# Patient Record
Sex: FEMALE
[De-identification: names, ages, dates, MRNs, and addresses within clinical notes are randomized per-mention and may not be internally consistent; named-entity substitution may affect disease eponyms.]

---

## 2021-04-22 ENCOUNTER — HOSPITAL ENCOUNTER (OUTPATIENT)
Dept: HOSPITAL 5 - XRAY | Age: 31
Discharge: HOME | End: 2021-04-22
Attending: PODIATRIST
Payer: MEDICAID

## 2021-04-22 DIAGNOSIS — M13.871: ICD-10-CM

## 2021-04-22 DIAGNOSIS — M13.872: Primary | ICD-10-CM

## 2021-04-22 NOTE — XRAY REPORT
XR foot BILAT 3+V, XR ankle BILAT 3+V



INDICATION / CLINICAL INFORMATION:

M12.9 PAIN.



COMPARISON: 

None available.



FINDINGS:

Right foot/ankle: No acute or healing fracture is identified. There is no malalignment. Lisfranc inte
rval is preserved. Ankle mortise is symmetric. No focal soft tissue abnormality.



Left foot/ankle: No acute or healing fracture is identified. No malalignment. Lisfranc interval is pr
eserved. Ankle mortise is symmetric. No focal soft tissue abnormality.



IMPRESSION:

No acute abnormality of bilateral feet or ankles. No acute or healing fracture is identified.



Signer Name: Justo Mosqueda MD 

Signed: 4/22/2021 2:55 PM

Workstation Name: [email or web address]

## 2021-04-22 NOTE — XRAY REPORT
XR foot BILAT 3+V, XR ankle BILAT 3+V



INDICATION / CLINICAL INFORMATION:

M12.9 PAIN.



COMPARISON: 

None available.



FINDINGS:

Right foot/ankle: No acute or healing fracture is identified. There is no malalignment. Lisfranc inte
rval is preserved. Ankle mortise is symmetric. No focal soft tissue abnormality.



Left foot/ankle: No acute or healing fracture is identified. No malalignment. Lisfranc interval is pr
eserved. Ankle mortise is symmetric. No focal soft tissue abnormality.



IMPRESSION:

No acute abnormality of bilateral feet or ankles. No acute or healing fracture is identified.



Signer Name: Justo Mosqueda MD 

Signed: 4/22/2021 2:55 PM

Workstation Name: [email or web address]

## 2021-07-19 ENCOUNTER — OFFICE VISIT (OUTPATIENT)
Dept: URBAN - METROPOLITAN AREA TELEHEALTH 2 | Facility: TELEHEALTH | Age: 31
End: 2021-07-19
Payer: MEDICAID

## 2021-07-19 ENCOUNTER — TELEPHONE ENCOUNTER (OUTPATIENT)
Dept: URBAN - METROPOLITAN AREA CLINIC 92 | Facility: CLINIC | Age: 31
End: 2021-07-19

## 2021-07-19 DIAGNOSIS — R10.13 EPIGASTRIC ABDOMINAL PAIN: ICD-10-CM

## 2021-07-19 DIAGNOSIS — K57.30 DIVERTICULA OF COLON: ICD-10-CM

## 2021-07-19 DIAGNOSIS — K44.9 HIATAL HERNIA: ICD-10-CM

## 2021-07-19 DIAGNOSIS — K64.9 HEMORRHOIDS, UNSPECIFIED HEMORRHOID TYPE: ICD-10-CM

## 2021-07-19 PROBLEM — 733657002: Status: ACTIVE | Noted: 2021-07-19

## 2021-07-19 PROCEDURE — 99442 PHONE E/M BY PHYS 11-20 MIN: CPT | Performed by: INTERNAL MEDICINE

## 2021-07-19 RX ORDER — OMEPRAZOLE 40 MG/1
1 CAPSULE 30 MINUTES BEFORE MORNING MEAL CAPSULE, DELAYED RELEASE ORAL ONCE A DAY
Qty: 30 | Refills: 6 | OUTPATIENT
Start: 2021-07-19

## 2021-07-19 NOTE — HPI-TODAY'S VISIT:
This a 30-year-old female presents for a follow-up.  She was last seen in our practice in 2017.  She notes that about 3-4 times a week she has postprandial abdominal pain which is burning in quality.  It is migratory.  It lasts only a few minutes and then resolve spontaneously.  Her bowel habits are normal.  There is no nausea vomiting.  There is no blood in stool.  She has not had an intentional 40 pound weight loss.  She is still 20 pounds heavier than last measured here.

## 2022-03-30 ENCOUNTER — OFFICE VISIT (OUTPATIENT)
Dept: URBAN - METROPOLITAN AREA CLINIC 118 | Facility: CLINIC | Age: 32
End: 2022-03-30
Payer: MEDICAID

## 2022-03-30 ENCOUNTER — LAB OUTSIDE AN ENCOUNTER (OUTPATIENT)
Dept: URBAN - METROPOLITAN AREA CLINIC 118 | Facility: CLINIC | Age: 32
End: 2022-03-30

## 2022-03-30 ENCOUNTER — WEB ENCOUNTER (OUTPATIENT)
Dept: URBAN - METROPOLITAN AREA CLINIC 118 | Facility: CLINIC | Age: 32
End: 2022-03-30

## 2022-03-30 VITALS
DIASTOLIC BLOOD PRESSURE: 81 MMHG | TEMPERATURE: 97.7 F | WEIGHT: 256 LBS | BODY MASS INDEX: 41.14 KG/M2 | HEIGHT: 66 IN | SYSTOLIC BLOOD PRESSURE: 127 MMHG | HEART RATE: 64 BPM

## 2022-03-30 DIAGNOSIS — R10.13 EPIGASTRIC ABDOMINAL PAIN: ICD-10-CM

## 2022-03-30 DIAGNOSIS — R10.11 RUQ ABDOMINAL PAIN: ICD-10-CM

## 2022-03-30 DIAGNOSIS — R11.0 NAUSEA: ICD-10-CM

## 2022-03-30 PROCEDURE — 99214 OFFICE O/P EST MOD 30 MIN: CPT | Performed by: INTERNAL MEDICINE

## 2022-03-30 RX ORDER — PANTOPRAZOLE SODIUM 40 MG/1
1 TABLET TABLET, DELAYED RELEASE ORAL ONCE A DAY
Qty: 90 | Refills: 3 | OUTPATIENT
Start: 2022-03-30

## 2022-03-30 RX ORDER — OMEPRAZOLE 40 MG/1
1 CAPSULE 30 MINUTES BEFORE MORNING MEAL CAPSULE, DELAYED RELEASE ORAL ONCE A DAY
Qty: 30 | Refills: 6 | Status: ACTIVE | COMMUNITY
Start: 2021-07-19

## 2022-03-30 NOTE — HPI-TODAY'S VISIT:
she reports post prandial abd pain, severe, sharp, non radiating, chronic nausea, intermittant, worse with eating and better with nothing also with ruq separate pain stopped the ppi a while ago abd pain present for the last few months, worsening   last seen 7/2021  on ppi  EGD 2015 small HH and hp neg gastritis colon 2013 shows hemorrhoids and tics (had diverticulitis at age 22 when was pregnant)

## 2022-04-05 ENCOUNTER — TELEPHONE ENCOUNTER (OUTPATIENT)
Dept: URBAN - METROPOLITAN AREA CLINIC 118 | Facility: CLINIC | Age: 32
End: 2022-04-05

## 2022-04-06 LAB — H. PYLORI STOOL AG, EIA: NEGATIVE

## 2022-04-21 ENCOUNTER — CLAIMS CREATED FROM THE CLAIM WINDOW (OUTPATIENT)
Dept: URBAN - METROPOLITAN AREA TELEHEALTH 2 | Facility: TELEHEALTH | Age: 32
End: 2022-04-21
Payer: MEDICAID

## 2022-04-21 ENCOUNTER — LAB OUTSIDE AN ENCOUNTER (OUTPATIENT)
Dept: URBAN - METROPOLITAN AREA TELEHEALTH 2 | Facility: TELEHEALTH | Age: 32
End: 2022-04-21

## 2022-04-21 VITALS — BODY MASS INDEX: 40.18 KG/M2 | HEIGHT: 66 IN | WEIGHT: 250 LBS

## 2022-04-21 DIAGNOSIS — R11.0 NAUSEA: ICD-10-CM

## 2022-04-21 DIAGNOSIS — R10.11 RUQ ABDOMINAL PAIN: ICD-10-CM

## 2022-04-21 DIAGNOSIS — R10.13 EPIGASTRIC ABDOMINAL PAIN: ICD-10-CM

## 2022-04-21 PROCEDURE — 99214 OFFICE O/P EST MOD 30 MIN: CPT | Performed by: INTERNAL MEDICINE

## 2022-04-21 RX ORDER — PANTOPRAZOLE SODIUM 40 MG/1
1 TABLET TABLET, DELAYED RELEASE ORAL ONCE A DAY
Qty: 90 | Refills: 3 | Status: ON HOLD | COMMUNITY
Start: 2022-03-30

## 2022-04-21 RX ORDER — OMEPRAZOLE 40 MG/1
1 CAPSULE 30 MINUTES BEFORE MORNING MEAL CAPSULE, DELAYED RELEASE ORAL ONCE A DAY
Qty: 30 | Refills: 6 | Status: ON HOLD | COMMUNITY
Start: 2021-07-19

## 2022-04-27 ENCOUNTER — OFFICE VISIT (OUTPATIENT)
Dept: URBAN - METROPOLITAN AREA CLINIC 118 | Facility: CLINIC | Age: 32
End: 2022-04-27

## 2022-04-27 ENCOUNTER — TELEPHONE ENCOUNTER (OUTPATIENT)
Dept: URBAN - METROPOLITAN AREA CLINIC 118 | Facility: CLINIC | Age: 32
End: 2022-04-27

## 2022-05-12 ENCOUNTER — TELEPHONE ENCOUNTER (OUTPATIENT)
Dept: URBAN - METROPOLITAN AREA CLINIC 118 | Facility: CLINIC | Age: 32
End: 2022-05-12

## 2022-05-18 ENCOUNTER — OFFICE VISIT (OUTPATIENT)
Dept: URBAN - METROPOLITAN AREA TELEHEALTH 2 | Facility: TELEHEALTH | Age: 32
End: 2022-05-18

## 2022-07-09 ENCOUNTER — TELEPHONE ENCOUNTER (OUTPATIENT)
Dept: URBAN - METROPOLITAN AREA CLINIC 118 | Facility: CLINIC | Age: 32
End: 2022-07-09

## 2022-07-18 ENCOUNTER — LAB OUTSIDE AN ENCOUNTER (OUTPATIENT)
Dept: URBAN - METROPOLITAN AREA CLINIC 118 | Facility: CLINIC | Age: 32
End: 2022-07-18

## 2022-07-18 ENCOUNTER — OFFICE VISIT (OUTPATIENT)
Dept: URBAN - METROPOLITAN AREA CLINIC 118 | Facility: CLINIC | Age: 32
End: 2022-07-18
Payer: MEDICAID

## 2022-07-18 VITALS
DIASTOLIC BLOOD PRESSURE: 83 MMHG | HEIGHT: 66 IN | SYSTOLIC BLOOD PRESSURE: 119 MMHG | BODY MASS INDEX: 42.43 KG/M2 | WEIGHT: 264 LBS

## 2022-07-18 DIAGNOSIS — R10.30 LOWER ABDOMINAL PAIN: ICD-10-CM

## 2022-07-18 DIAGNOSIS — R10.32 LLQ ABDOMINAL PAIN: ICD-10-CM

## 2022-07-18 DIAGNOSIS — R11.0 NAUSEA: ICD-10-CM

## 2022-07-18 DIAGNOSIS — R63.0 LOSS OF APPETITE: ICD-10-CM

## 2022-07-18 DIAGNOSIS — Z90.49 STATUS POST LAPAROSCOPIC CHOLECYSTECTOMY: ICD-10-CM

## 2022-07-18 DIAGNOSIS — R19.7 DIARRHEA, UNSPECIFIED TYPE: ICD-10-CM

## 2022-07-18 DIAGNOSIS — R19.7 ACUTE DIARRHEA: ICD-10-CM

## 2022-07-18 PROCEDURE — 99214 OFFICE O/P EST MOD 30 MIN: CPT | Performed by: INTERNAL MEDICINE

## 2022-07-18 RX ORDER — LEVOTHYROXINE SODIUM 50 UG/1
1 TABLET IN THE MORNING ON AN EMPTY STOMACH TABLET ORAL ONCE A DAY
Status: ACTIVE | COMMUNITY

## 2022-07-18 RX ORDER — PANTOPRAZOLE SODIUM 40 MG/1
1 TABLET TABLET, DELAYED RELEASE ORAL ONCE A DAY
Qty: 90 | Refills: 3 | Status: ACTIVE | COMMUNITY
Start: 2022-03-30

## 2022-07-18 RX ORDER — CHOLESTYRAMINE 4 G/9G
1 SCOOP POWDER, FOR SUSPENSION ORAL TID
Qty: 1080 GRAM | Refills: 3 | OUTPATIENT
Start: 2022-07-18

## 2022-07-18 RX ORDER — OMEPRAZOLE 40 MG/1
1 CAPSULE 30 MINUTES BEFORE MORNING MEAL CAPSULE, DELAYED RELEASE ORAL ONCE A DAY
Qty: 30 | Refills: 6 | Status: ON HOLD | COMMUNITY
Start: 2021-07-19

## 2022-07-18 NOTE — HPI-TODAY'S VISIT:
LLQ pelvic pain, radiates to the right, can be contraction quality, pelvic pressure  also associated with nausea loss of appetite bowels are loose after the surgery hasn't been on cholestyramine   had the [facility] June 2022 after abnormal HIDA now with lower abd pain (the other pain is resolved)  CT with contrast 6/30/22   No acute inflammatory process abdomen or pelvis. No bowel obstruction   1.9 x 2.1 cm left ovarian cystic lesion incompletely evaluated.   Cholecystectomy without bile duct dilatation  Pelvic ultrasound:   Clinical indications: Pelvic pain   Comparison: None   Multiple transabdominal and transvaginal grayscale and Doppler images are provided.   Transabdominal imaging shows an anteverted uterus measuring 10.0 x 4.1 x 6.27 m. Uterine detail is limited by body habitus. Left ovary measures 4.2 x 1.8 x 3.2 cm. There is Doppler flow in the left ovary. The right ovary is not seen transabdominally.   Transvaginal imaging shows homogeneous uterine wall echogenicity. The endometrium measures 1.2 cm. Right ovary is unremarkable measuring 3.8 x 2.1 x 2.2 cm. Doppler flow is seen in the right ovary. There is no adnexal mass or pelvic free fluid.   IMPRESSION:   Unremarkable pelvic ultrasound.   Approved By: Gagandeep Snyder MD  7/8/2022 4:39 PM  [facility]

## 2022-07-20 PROBLEM — 79890006: Status: ACTIVE | Noted: 2022-07-18

## 2022-07-20 PROBLEM — 428882003: Status: ACTIVE | Noted: 2022-07-18

## 2022-07-20 PROBLEM — 301716002 LEFT LOWER QUADRANT PAIN: Status: ACTIVE | Noted: 2022-07-20

## 2022-07-27 ENCOUNTER — OFFICE VISIT (OUTPATIENT)
Dept: URBAN - METROPOLITAN AREA TELEHEALTH 2 | Facility: TELEHEALTH | Age: 32
End: 2022-07-27

## 2022-07-28 ENCOUNTER — OFFICE VISIT (OUTPATIENT)
Dept: URBAN - METROPOLITAN AREA CLINIC 109 | Facility: CLINIC | Age: 32
End: 2022-07-28

## 2022-07-29 ENCOUNTER — TELEPHONE ENCOUNTER (OUTPATIENT)
Dept: URBAN - METROPOLITAN AREA CLINIC 118 | Facility: CLINIC | Age: 32
End: 2022-07-29

## 2022-08-11 ENCOUNTER — CLAIMS CREATED FROM THE CLAIM WINDOW (OUTPATIENT)
Dept: URBAN - METROPOLITAN AREA SURGERY CENTER 23 | Facility: SURGERY CENTER | Age: 32
End: 2022-08-11
Payer: MEDICAID

## 2022-08-11 ENCOUNTER — CLAIMS CREATED FROM THE CLAIM WINDOW (OUTPATIENT)
Dept: URBAN - METROPOLITAN AREA CLINIC 4 | Facility: CLINIC | Age: 32
End: 2022-08-11
Payer: MEDICAID

## 2022-08-11 ENCOUNTER — TELEPHONE ENCOUNTER (OUTPATIENT)
Dept: URBAN - METROPOLITAN AREA CLINIC 92 | Facility: CLINIC | Age: 32
End: 2022-08-11

## 2022-08-11 DIAGNOSIS — R10.84 ABDOMINAL CRAMPING, GENERALIZED: ICD-10-CM

## 2022-08-11 DIAGNOSIS — R19.4 ALTERATION IN BOWEL ELIMINATION: ICD-10-CM

## 2022-08-11 DIAGNOSIS — K31.89 ACQUIRED DEFORMITY OF DUODENUM: ICD-10-CM

## 2022-08-11 DIAGNOSIS — K63.89 OTHER SPECIFIED DISEASES OF INTESTINE: ICD-10-CM

## 2022-08-11 DIAGNOSIS — K31.89 OTHER DISEASES OF STOMACH AND DUODENUM: ICD-10-CM

## 2022-08-11 PROBLEM — 62315008: Status: ACTIVE | Noted: 2022-07-20

## 2022-08-11 PROBLEM — 422587007 NAUSEA: Status: ACTIVE | Noted: 2022-07-20

## 2022-08-11 PROBLEM — 54586004: Status: ACTIVE | Noted: 2022-07-20

## 2022-08-11 PROBLEM — 197125005: Status: ACTIVE | Noted: 2022-08-11

## 2022-08-11 PROCEDURE — 45380 COLONOSCOPY AND BIOPSY: CPT | Performed by: INTERNAL MEDICINE

## 2022-08-11 PROCEDURE — G8907 PT DOC NO EVENTS ON DISCHARG: HCPCS | Performed by: INTERNAL MEDICINE

## 2022-08-11 PROCEDURE — 43239 EGD BIOPSY SINGLE/MULTIPLE: CPT | Performed by: INTERNAL MEDICINE

## 2022-08-11 PROCEDURE — 88305 TISSUE EXAM BY PATHOLOGIST: CPT | Performed by: PATHOLOGY

## 2022-08-11 PROCEDURE — 88312 SPECIAL STAINS GROUP 1: CPT | Performed by: PATHOLOGY

## 2022-08-11 PROCEDURE — 88313 SPECIAL STAINS GROUP 2: CPT | Performed by: PATHOLOGY

## 2022-08-11 PROCEDURE — 88342 IMHCHEM/IMCYTCHM 1ST ANTB: CPT | Performed by: PATHOLOGY

## 2022-08-11 RX ORDER — PANTOPRAZOLE SODIUM 40 MG/1
1 TABLET TABLET, DELAYED RELEASE ORAL ONCE A DAY
Qty: 90 | Refills: 3 | Status: ACTIVE | COMMUNITY
Start: 2022-03-30

## 2022-08-11 RX ORDER — LEVOTHYROXINE SODIUM 50 UG/1
1 TABLET IN THE MORNING ON AN EMPTY STOMACH TABLET ORAL ONCE A DAY
Status: ACTIVE | COMMUNITY

## 2022-08-11 RX ORDER — CHOLESTYRAMINE 4 G/9G
1 SCOOP POWDER, FOR SUSPENSION ORAL TID
Qty: 1080 GRAM | Refills: 3 | Status: ACTIVE | COMMUNITY
Start: 2022-07-18

## 2022-08-11 RX ORDER — OMEPRAZOLE 40 MG/1
1 CAPSULE 30 MINUTES BEFORE MORNING MEAL CAPSULE, DELAYED RELEASE ORAL ONCE A DAY
Qty: 30 | Refills: 6 | Status: ON HOLD | COMMUNITY
Start: 2021-07-19

## 2022-08-11 RX ORDER — RIFAXIMIN 550 MG/1
1 TABLET TABLET ORAL THREE TIMES A DAY
Qty: 42 TABLET | Refills: 1 | OUTPATIENT
Start: 2022-08-11 | End: 2022-09-08

## 2022-09-14 ENCOUNTER — OFFICE VISIT (OUTPATIENT)
Dept: URBAN - METROPOLITAN AREA CLINIC 109 | Facility: CLINIC | Age: 32
End: 2022-09-14

## 2022-09-21 ENCOUNTER — OFFICE VISIT (OUTPATIENT)
Dept: URBAN - METROPOLITAN AREA CLINIC 109 | Facility: CLINIC | Age: 32
End: 2022-09-21

## 2023-02-24 ENCOUNTER — OFFICE VISIT (OUTPATIENT)
Dept: URBAN - METROPOLITAN AREA TELEHEALTH 2 | Facility: TELEHEALTH | Age: 33
End: 2023-02-24

## 2023-05-27 ENCOUNTER — TELEPHONE ENCOUNTER (OUTPATIENT)
Dept: URBAN - METROPOLITAN AREA CLINIC 118 | Facility: CLINIC | Age: 33
End: 2023-05-27

## 2023-05-27 RX ORDER — SULFAMETHOXAZOLE AND TRIMETHOPRIM 800; 160 MG/1; MG/1
1 TABLET TABLET ORAL TWICE A DAY
Qty: 14 TABLET | Refills: 0 | OUTPATIENT
Start: 2023-05-27 | End: 2023-06-03

## 2023-05-31 ENCOUNTER — CLAIMS CREATED FROM THE CLAIM WINDOW (OUTPATIENT)
Dept: URBAN - METROPOLITAN AREA TELEHEALTH 2 | Facility: TELEHEALTH | Age: 33
End: 2023-05-31
Payer: MEDICAID

## 2023-05-31 DIAGNOSIS — R10.32 LLQ ABDOMINAL PAIN: ICD-10-CM

## 2023-05-31 DIAGNOSIS — R10.30 LOWER ABDOMINAL PAIN: ICD-10-CM

## 2023-05-31 DIAGNOSIS — K58.0 IRRITABLE BOWEL SYNDROME WITH DIARRHEA: ICD-10-CM

## 2023-05-31 DIAGNOSIS — R63.0 LOSS OF APPETITE: ICD-10-CM

## 2023-05-31 DIAGNOSIS — R11.0 NAUSEA: ICD-10-CM

## 2023-05-31 DIAGNOSIS — Z12.11 COLON CANCER SCREENING: ICD-10-CM

## 2023-05-31 DIAGNOSIS — Z90.49 STATUS POST LAPAROSCOPIC CHOLECYSTECTOMY: ICD-10-CM

## 2023-05-31 DIAGNOSIS — K57.92 DIVERTICULITIS: ICD-10-CM

## 2023-05-31 DIAGNOSIS — R10.84 ABDOMINAL CRAMPING, GENERALIZED: ICD-10-CM

## 2023-05-31 DIAGNOSIS — R19.7 DIARRHEA, UNSPECIFIED TYPE: ICD-10-CM

## 2023-05-31 PROBLEM — 307496006: Status: ACTIVE | Noted: 2023-05-31

## 2023-05-31 PROCEDURE — 99214 OFFICE O/P EST MOD 30 MIN: CPT | Performed by: INTERNAL MEDICINE

## 2023-05-31 RX ORDER — MOXIFLOXACIN HYDROCHLORIDE 400 MG/1
AS DIRECTED TABLET, FILM COATED ORAL ONCE DAILY
Qty: 7 | Refills: 0 | OUTPATIENT
Start: 2023-05-31

## 2023-05-31 RX ORDER — CHOLESTYRAMINE 4 G/9G
1 SCOOP POWDER, FOR SUSPENSION ORAL TID
Qty: 1080 GRAM | Refills: 3 | Status: ACTIVE | COMMUNITY
Start: 2022-07-18

## 2023-05-31 RX ORDER — LEVOTHYROXINE SODIUM 50 UG/1
1 TABLET IN THE MORNING ON AN EMPTY STOMACH TABLET ORAL ONCE A DAY
Status: ACTIVE | COMMUNITY

## 2023-05-31 RX ORDER — SULFAMETHOXAZOLE AND TRIMETHOPRIM 800; 160 MG/1; MG/1
1 TABLET TABLET ORAL TWICE A DAY
Qty: 14 TABLET | Refills: 0 | Status: ACTIVE | COMMUNITY
Start: 2023-05-27 | End: 2023-06-03

## 2023-05-31 RX ORDER — OMEPRAZOLE 40 MG/1
1 CAPSULE 30 MINUTES BEFORE MORNING MEAL CAPSULE, DELAYED RELEASE ORAL ONCE A DAY
Qty: 30 | Refills: 6 | Status: ON HOLD | COMMUNITY
Start: 2021-07-19

## 2023-05-31 RX ORDER — ONDANSETRON 4 MG/1
1 TABLET ON THE TONGUE AND ALLOW TO DISSOLVE TABLET, ORALLY DISINTEGRATING ORAL
Qty: 45 | Refills: 3 | OUTPATIENT
Start: 2023-05-31

## 2023-05-31 RX ORDER — PANTOPRAZOLE SODIUM 40 MG/1
1 TABLET TABLET, DELAYED RELEASE ORAL ONCE A DAY
Qty: 90 | Refills: 3 | Status: ACTIVE | COMMUNITY
Start: 2022-03-30

## 2023-05-31 NOTE — HPI-TODAY'S VISIT:
f/u visit S/p EGD?Colonoscopy 8/2022.  EGD benign with benign path in duod and stomach.  Colon with benign path from ileum and colon.  Dx with diverticulitis last week Another episode occured in 2012 She was not able to tolerate treatment and only took 4 days of cipro/flagyl   . PRIOR VISIT: LLQ pelvic pain, radiates to the right, can be contraction quality, pelvic pressure  also associated with nausea loss of appetite bowels are loose after the surgery hasn't been on cholestyramine   had the [facility] June 2022 after abnormal HIDA now with lower abd pain (the other pain is resolved)  CT with contrast 6/30/22   No acute inflammatory process abdomen or pelvis. No bowel obstruction   1.9 x 2.1 cm left ovarian cystic lesion incompletely evaluated.   Cholecystectomy without bile duct dilatation  Pelvic ultrasound:   Clinical indications: Pelvic pain   Comparison: None   Multiple transabdominal and transvaginal grayscale and Doppler images are provided.   Transabdominal imaging shows an anteverted uterus measuring 10.0 x 4.1 x 6.27 m. Uterine detail is limited by body habitus. Left ovary measures 4.2 x 1.8 x 3.2 cm. There is Doppler flow in the left ovary. The right ovary is not seen transabdominally.   Transvaginal imaging shows homogeneous uterine wall echogenicity. The endometrium measures 1.2 cm. Right ovary is unremarkable measuring 3.8 x 2.1 x 2.2 cm. Doppler flow is seen in the right ovary. There is no adnexal mass or pelvic free fluid.   IMPRESSION:   Unremarkable pelvic ultrasound.   Approved By: Gagandeep Snyder MD  7/8/2022 4:39 PM  [facility]

## 2023-07-11 ENCOUNTER — OFFICE VISIT (OUTPATIENT)
Dept: URBAN - METROPOLITAN AREA CLINIC 118 | Facility: CLINIC | Age: 33
End: 2023-07-11

## 2023-08-03 ENCOUNTER — OFFICE VISIT (OUTPATIENT)
Dept: URBAN - METROPOLITAN AREA CLINIC 25 | Facility: CLINIC | Age: 33
End: 2023-08-03
Payer: MEDICAID

## 2023-08-03 VITALS
BODY MASS INDEX: 43.23 KG/M2 | HEART RATE: 90 BPM | WEIGHT: 269 LBS | HEIGHT: 66 IN | TEMPERATURE: 97.7 F | SYSTOLIC BLOOD PRESSURE: 108 MMHG | DIASTOLIC BLOOD PRESSURE: 75 MMHG

## 2023-08-03 DIAGNOSIS — K57.92 DIVERTICULITIS: ICD-10-CM

## 2023-08-03 PROCEDURE — 99214 OFFICE O/P EST MOD 30 MIN: CPT | Performed by: INTERNAL MEDICINE

## 2023-08-03 RX ORDER — CHOLESTYRAMINE 4 G/9G
1 SCOOP POWDER, FOR SUSPENSION ORAL TID
Qty: 1080 GRAM | Refills: 3 | Status: ACTIVE | COMMUNITY
Start: 2022-07-18

## 2023-08-03 RX ORDER — MOXIFLOXACIN HYDROCHLORIDE 400 MG/1
AS DIRECTED TABLET, FILM COATED ORAL ONCE DAILY
Qty: 7 | Refills: 0 | Status: ACTIVE | COMMUNITY
Start: 2023-05-31

## 2023-08-03 RX ORDER — PANTOPRAZOLE SODIUM 40 MG/1
1 TABLET TABLET, DELAYED RELEASE ORAL ONCE A DAY
Qty: 90 | Refills: 3 | Status: ACTIVE | COMMUNITY
Start: 2022-03-30

## 2023-08-03 RX ORDER — OMEPRAZOLE 40 MG/1
1 CAPSULE 30 MINUTES BEFORE MORNING MEAL CAPSULE, DELAYED RELEASE ORAL ONCE A DAY
Qty: 30 | Refills: 6 | Status: ON HOLD | COMMUNITY
Start: 2021-07-19

## 2023-08-03 RX ORDER — ONDANSETRON 4 MG/1
1 TABLET ON THE TONGUE AND ALLOW TO DISSOLVE TABLET, ORALLY DISINTEGRATING ORAL
Qty: 45 | Refills: 3 | Status: ACTIVE | COMMUNITY
Start: 2023-05-31

## 2023-08-03 RX ORDER — LEVOTHYROXINE SODIUM 50 UG/1
1 TABLET IN THE MORNING ON AN EMPTY STOMACH TABLET ORAL ONCE A DAY
Status: ACTIVE | COMMUNITY

## 2023-08-03 NOTE — HPI-TODAY'S VISIT:
33 yo pt with chronic diarrhea and abd pain with dx of IBD. She has had recent llq abd pain with July 30th showing possible diverticulitis. Pt has been cipro and flagyl for 4 days a stopped the meds due to increasing abd pain.

## 2023-08-25 ENCOUNTER — OFFICE VISIT (OUTPATIENT)
Dept: URBAN - METROPOLITAN AREA CLINIC 109 | Facility: CLINIC | Age: 33
End: 2023-08-25

## 2023-08-25 RX ORDER — ONDANSETRON 4 MG/1
1 TABLET ON THE TONGUE AND ALLOW TO DISSOLVE TABLET, ORALLY DISINTEGRATING ORAL
Qty: 45 | Refills: 3 | Status: ACTIVE | COMMUNITY
Start: 2023-05-31

## 2023-08-25 RX ORDER — CHOLESTYRAMINE 4 G/9G
1 SCOOP POWDER, FOR SUSPENSION ORAL TID
Qty: 1080 GRAM | Refills: 3 | Status: ACTIVE | COMMUNITY
Start: 2022-07-18

## 2023-08-25 RX ORDER — PANTOPRAZOLE SODIUM 40 MG/1
1 TABLET TABLET, DELAYED RELEASE ORAL ONCE A DAY
Qty: 90 | Refills: 3 | Status: ACTIVE | COMMUNITY
Start: 2022-03-30

## 2023-08-25 RX ORDER — OMEPRAZOLE 40 MG/1
1 CAPSULE 30 MINUTES BEFORE MORNING MEAL CAPSULE, DELAYED RELEASE ORAL ONCE A DAY
Qty: 30 | Refills: 6 | Status: ON HOLD | COMMUNITY
Start: 2021-07-19

## 2023-08-25 RX ORDER — MOXIFLOXACIN HYDROCHLORIDE 400 MG/1
AS DIRECTED TABLET, FILM COATED ORAL ONCE DAILY
Qty: 7 | Refills: 0 | Status: ACTIVE | COMMUNITY
Start: 2023-05-31

## 2023-08-25 RX ORDER — LEVOTHYROXINE SODIUM 50 UG/1
1 TABLET IN THE MORNING ON AN EMPTY STOMACH TABLET ORAL ONCE A DAY
Status: ACTIVE | COMMUNITY

## 2023-11-06 ENCOUNTER — CLAIMS CREATED FROM THE CLAIM WINDOW (OUTPATIENT)
Dept: URBAN - METROPOLITAN AREA CLINIC 118 | Facility: CLINIC | Age: 33
End: 2023-11-06
Payer: MEDICAID

## 2023-11-06 ENCOUNTER — DASHBOARD ENCOUNTERS (OUTPATIENT)
Age: 33
End: 2023-11-06

## 2023-11-06 VITALS
TEMPERATURE: 97.9 F | WEIGHT: 278.8 LBS | DIASTOLIC BLOOD PRESSURE: 75 MMHG | SYSTOLIC BLOOD PRESSURE: 119 MMHG | HEART RATE: 64 BPM | BODY MASS INDEX: 44.81 KG/M2 | HEIGHT: 66 IN

## 2023-11-06 DIAGNOSIS — R11.0 NAUSEA: ICD-10-CM

## 2023-11-06 DIAGNOSIS — R19.7 DIARRHEA, UNSPECIFIED TYPE: ICD-10-CM

## 2023-11-06 DIAGNOSIS — R10.32 LLQ ABDOMINAL PAIN: ICD-10-CM

## 2023-11-06 DIAGNOSIS — K57.92 ACUTE DIVERTICULITIS: ICD-10-CM

## 2023-11-06 DIAGNOSIS — R19.7 ACUTE DIARRHEA: ICD-10-CM

## 2023-11-06 PROCEDURE — 99214 OFFICE O/P EST MOD 30 MIN: CPT | Performed by: INTERNAL MEDICINE

## 2023-11-06 RX ORDER — PANTOPRAZOLE SODIUM 40 MG/1
1 TABLET TABLET, DELAYED RELEASE ORAL ONCE A DAY
Qty: 90 | Refills: 3 | Status: ACTIVE | COMMUNITY
Start: 2022-03-30

## 2023-11-06 RX ORDER — LEVOTHYROXINE SODIUM 88 UG/1
1 TABLET IN THE MORNING ON AN EMPTY STOMACH TABLET ORAL ONCE A DAY
Status: ACTIVE | COMMUNITY

## 2023-11-06 RX ORDER — MOXIFLOXACIN HYDROCHLORIDE 400 MG/1
AS DIRECTED TABLET, FILM COATED ORAL ONCE DAILY
Qty: 7 | Refills: 0 | Status: ACTIVE | COMMUNITY
Start: 2023-05-31

## 2023-11-06 RX ORDER — CHOLESTYRAMINE 4 G/9G
1 SCOOP POWDER, FOR SUSPENSION ORAL TID
Qty: 1080 GRAM | Refills: 3 | Status: ACTIVE | COMMUNITY
Start: 2022-07-18

## 2023-11-06 RX ORDER — ONDANSETRON 4 MG/1
1 TABLET ON THE TONGUE AND ALLOW TO DISSOLVE TABLET, ORALLY DISINTEGRATING ORAL
Qty: 45 | Refills: 3 | Status: ACTIVE | COMMUNITY
Start: 2023-05-31

## 2023-11-06 RX ORDER — OMEPRAZOLE 40 MG/1
1 CAPSULE 30 MINUTES BEFORE MORNING MEAL CAPSULE, DELAYED RELEASE ORAL ONCE A DAY
Qty: 30 | Refills: 6 | Status: ON HOLD | COMMUNITY
Start: 2021-07-19

## 2023-11-06 NOTE — HPI-TODAY'S VISIT:
patient here for followup, I last saw her for EGD/colon 8/2022 has a few missed/Rescheduled and cancelled appts since then and has seen Dr Mendel and Dr Scott, had 2 more episodes diverticulitis as below reports this makes 3 episodes total  EGD/colon 8/2022  egd with 2cm hh and gastritis, no celiac no hp colon:- The examined portion of the ileum was normal. Biopsied.- Diverticulosis in the descending colon.- Normal mucosa in the entire examined colon. Biopsied.- The examination was otherwise normal. bx normal  CT 7/2023  Acute uncomplicated descending colonic diverticulitis. CT 5/2023 Acute uncomplicated sigmoid diverticulitis.  OV 7/2022  LLQ pelvic pain, radiates to the right, can be contraction quality, pelvic pressure  also associated with nausea loss of appetite bowels are loose after the surgery hasn't been on cholestyramine   had the [facility] June 2022 after abnormal HIDA now with lower abd pain (the other pain is resolved)  CT with contrast 6/30/22   No acute inflammatory process abdomen or pelvis. No bowel obstruction   1.9 x 2.1 cm left ovarian cystic lesion incompletely evaluated.   Cholecystectomy without bile duct dilatation  Pelvic ultrasound:   Clinical indications: Pelvic pain   Comparison: None   Multiple transabdominal and transvaginal grayscale and Doppler images are provided.   Transabdominal imaging shows an anteverted uterus measuring 10.0 x 4.1 x 6.27 m. Uterine detail is limited by body habitus. Left ovary measures 4.2 x 1.8 x 3.2 cm. There is Doppler flow in the left ovary. The right ovary is not seen transabdominally.   Transvaginal imaging shows homogeneous uterine wall echogenicity. The endometrium measures 1.2 cm. Right ovary is unremarkable measuring 3.8 x 2.1 x 2.2 cm. Doppler flow is seen in the right ovary. There is no adnexal mass or pelvic free fluid.   IMPRESSION:   Unremarkable pelvic ultrasound.   Approved By: Gagandeep Snyder MD  7/8/2022 4:39 PM  [place]

## 2025-04-23 ENCOUNTER — OFFICE VISIT (OUTPATIENT)
Dept: URBAN - METROPOLITAN AREA CLINIC 118 | Facility: CLINIC | Age: 35
End: 2025-04-23

## 2025-04-23 RX ORDER — MOXIFLOXACIN HYDROCHLORIDE TABLETS, 400 MG 400 MG/1
AS DIRECTED TABLET, FILM COATED ORAL ONCE DAILY
Qty: 7 | Refills: 0 | Status: ACTIVE | COMMUNITY
Start: 2023-05-31

## 2025-04-23 RX ORDER — ONDANSETRON 4 MG/1
1 TABLET ON THE TONGUE AND ALLOW TO DISSOLVE TABLET, ORALLY DISINTEGRATING ORAL
Qty: 45 | Refills: 3 | Status: ACTIVE | COMMUNITY
Start: 2023-05-31

## 2025-04-23 RX ORDER — LEVOTHYROXINE SODIUM 88 UG/1
1 TABLET IN THE MORNING ON AN EMPTY STOMACH TABLET ORAL ONCE A DAY
Status: ACTIVE | COMMUNITY

## 2025-04-23 RX ORDER — CHOLESTYRAMINE 4 G/9G
1 SCOOP POWDER, FOR SUSPENSION ORAL TID
Qty: 1080 GRAM | Refills: 3 | Status: ACTIVE | COMMUNITY
Start: 2022-07-18

## 2025-04-23 RX ORDER — OMEPRAZOLE 40 MG/1
1 CAPSULE 30 MINUTES BEFORE MORNING MEAL CAPSULE, DELAYED RELEASE ORAL ONCE A DAY
Qty: 30 | Refills: 6 | Status: ON HOLD | COMMUNITY
Start: 2021-07-19

## 2025-04-23 RX ORDER — PANTOPRAZOLE SODIUM 40 MG/1
1 TABLET TABLET, DELAYED RELEASE ORAL ONCE A DAY
Qty: 90 | Refills: 3 | Status: ACTIVE | COMMUNITY
Start: 2022-03-30

## 2025-04-29 ENCOUNTER — OFFICE VISIT (OUTPATIENT)
Dept: URBAN - METROPOLITAN AREA CLINIC 118 | Facility: CLINIC | Age: 35
End: 2025-04-29
Payer: SELF-PAY

## 2025-04-29 DIAGNOSIS — K57.92 ACUTE DIVERTICULITIS: ICD-10-CM

## 2025-04-29 DIAGNOSIS — R74.8 ELEVATED ALKALINE PHOSPHATASE LEVEL: ICD-10-CM

## 2025-04-29 PROCEDURE — 99214 OFFICE O/P EST MOD 30 MIN: CPT | Performed by: INTERNAL MEDICINE

## 2025-04-29 RX ORDER — LEVOTHYROXINE SODIUM 100 UG/1
1 TABLET IN THE MORNING ON AN EMPTY STOMACH TABLET ORAL ONCE A DAY
Status: ACTIVE | COMMUNITY

## 2025-04-29 RX ORDER — CHOLECALCIFEROL (VITAMIN D3) 1250 MCG
1 CAPSULE CAPSULE ORAL
Status: ACTIVE | COMMUNITY

## 2025-04-29 RX ORDER — ONDANSETRON 8 MG/1
1 TABLET ON THE TONGUE AND ALLOW TO DISSOLVE  AS NEEDED TABLET, ORALLY DISINTEGRATING ORAL ONCE A DAY
Status: ACTIVE | COMMUNITY

## 2025-04-29 RX ORDER — NORETHINDRONE 0.35 MG/1
1 TABLET TABLET ORAL ONCE A DAY
Status: ACTIVE | COMMUNITY

## 2025-04-29 RX ORDER — IBUPROFEN 800 MG/1
1 TABLET WITH FOOD OR MILK AS NEEDED TABLET, FILM COATED ORAL
Status: ACTIVE | COMMUNITY

## 2025-04-29 RX ORDER — CIPROFLOXACIN 500 MG/1
1 TABLET TABLET, FILM COATED ORAL
Status: ACTIVE | COMMUNITY

## 2025-04-29 RX ORDER — METRONIDAZOLE 500 MG/1
1 TABLET TABLET ORAL THREE TIMES A DAY
Status: ACTIVE | COMMUNITY

## 2025-04-29 NOTE — HPI-TODAY'S VISIT:
Patient is a 34 year old female who presents for abdominal pain x 3 weeks.  Patient reports intermittent lower abdominal pain that radiates to her back. Associated constipation. Denies rectal bleeding or unintentional wt loss. Per review of records in [facility], she presented to [facility] ED on 4/23/2025 for further evaluation. Diagnosed with acute diverticulitis on CTAP. Treated with Cipro/Flagyl (PCN allergy). She stopped taking abx last night b/c they make her sick. Zofran doesn't help. She feels pain is the same if not worse than prior to treatment. She was very tearful in today's visit, "tired of being in chronic pain."  Of note, patient was last seen in clinic in 2023 by Dr. Ochoa. She was given referral to surgery for recurrent diverticulitis but did not go see them as recommended.  Last colon in 8/2022 with tics in [place]. Otherwise normal. Bx without significant abnormality.  Other relevant hospital work up- Labs with mild leukocytosis (WBC 12.6). LFTs with elevated alk phos 109. Lipase normal. Pregnancy negative. CTAP imaging c/w uncomplicated acute diverticulitis involving the descending and sigmoid colon.  She has pelvic U/S scheduled for tomorrow for evaluation of possible endometriosis.

## 2025-05-06 ENCOUNTER — TELEPHONE ENCOUNTER (OUTPATIENT)
Dept: URBAN - METROPOLITAN AREA CLINIC 118 | Facility: CLINIC | Age: 35
End: 2025-05-06

## 2025-05-06 ENCOUNTER — OFFICE VISIT (OUTPATIENT)
Dept: URBAN - METROPOLITAN AREA CLINIC 118 | Facility: CLINIC | Age: 35
End: 2025-05-06

## 2025-05-13 ENCOUNTER — TELEPHONE ENCOUNTER (OUTPATIENT)
Dept: URBAN - METROPOLITAN AREA CLINIC 109 | Facility: CLINIC | Age: 35
End: 2025-05-13

## 2025-05-13 RX ORDER — DICYCLOMINE HYDROCHLORIDE 20 MG/1
1 TABLET TABLET ORAL THREE TIMES A DAY
Qty: 90 | OUTPATIENT
Start: 2025-05-13

## 2025-05-15 LAB
ACTIN (SMOOTH MUSCLE) ANTIBODY (IGG): <20
ALBUMIN/GLOBULIN RATIO: 1.3
ALBUMIN: 3.9
ALKALINE PHOSPHATASE: 77
ALT (SGPT): 8
ANA SCREEN, IFA: POSITIVE
AST (SGOT): 13
BILIRUBIN, DIRECT: 0.1
BILIRUBIN, INDIRECT: 0.3
BILIRUBIN, TOTAL: 0.4
GLOBULIN: 3.1
MITOCHONDRIAL (M2) ANTIBODY: <=20
PROTEIN, TOTAL: 7

## 2025-05-21 ENCOUNTER — TELEPHONE ENCOUNTER (OUTPATIENT)
Dept: URBAN - METROPOLITAN AREA CLINIC 118 | Facility: CLINIC | Age: 35
End: 2025-05-21

## 2025-07-08 ENCOUNTER — OFFICE VISIT (OUTPATIENT)
Dept: URBAN - METROPOLITAN AREA CLINIC 118 | Facility: CLINIC | Age: 35
End: 2025-07-08